# Patient Record
Sex: FEMALE | Race: WHITE | NOT HISPANIC OR LATINO | Employment: UNEMPLOYED | URBAN - METROPOLITAN AREA
[De-identification: names, ages, dates, MRNs, and addresses within clinical notes are randomized per-mention and may not be internally consistent; named-entity substitution may affect disease eponyms.]

---

## 2017-04-28 ENCOUNTER — OFFICE VISIT (OUTPATIENT)
Dept: SURGERY | Facility: CLINIC | Age: 42
End: 2017-04-28
Payer: COMMERCIAL

## 2017-04-28 VITALS
HEIGHT: 67 IN | WEIGHT: 135.56 LBS | TEMPERATURE: 98 F | SYSTOLIC BLOOD PRESSURE: 118 MMHG | DIASTOLIC BLOOD PRESSURE: 71 MMHG | BODY MASS INDEX: 21.28 KG/M2 | HEART RATE: 70 BPM

## 2017-04-28 DIAGNOSIS — K42.9 UMBILICAL HERNIA WITHOUT OBSTRUCTION AND WITHOUT GANGRENE: ICD-10-CM

## 2017-04-28 DIAGNOSIS — K40.90 NON-RECURRENT UNILATERAL INGUINAL HERNIA WITHOUT OBSTRUCTION OR GANGRENE: Primary | ICD-10-CM

## 2017-04-28 PROCEDURE — 1160F RVW MEDS BY RX/DR IN RCRD: CPT | Mod: S$GLB,,, | Performed by: SURGERY

## 2017-04-28 PROCEDURE — 99999 PR PBB SHADOW E&M-EST. PATIENT-LVL III: CPT | Mod: PBBFAC,,, | Performed by: SURGERY

## 2017-04-28 PROCEDURE — 99204 OFFICE O/P NEW MOD 45 MIN: CPT | Mod: S$GLB,,, | Performed by: SURGERY

## 2017-04-28 NOTE — PROGRESS NOTES
Subjective:       Patient ID: Elizabeth Vernon is a 41 y.o. female.    Chief Complaint: Consult (-Groin Hernia)    HPI  Pleasant 42 yo F presenting to me for evaluation of hernia.  Pt notes that she has noted an umbilical bulge for several months to years  SH enotes that it doesn't really bother her but that it does seem to have gotten slightly larger.  She also notes a slight bulge in her L inguinal area that she is concerned about as being a hernia.  The one in the L groin is slightly uncomfortable.  She denies n/v.  No fever/chills.  No changes in bowel habits  Pt is otherwise healthy.  She has had an appendectomy but no other abdominal surgery  Review of Systems   Constitutional: Negative for activity change, appetite change, fever and unexpected weight change.   Respiratory: Negative for chest tightness, shortness of breath and wheezing.    Cardiovascular: Negative for chest pain.   Gastrointestinal: Positive for abdominal pain. Negative for abdominal distention, constipation, diarrhea, nausea and vomiting.   Genitourinary: Negative for difficulty urinating, dysuria and frequency.   Skin: Negative for color change and wound.   Neurological: Negative for dizziness.   Hematological: Negative for adenopathy. Does not bruise/bleed easily.   Psychiatric/Behavioral: Negative for agitation and decreased concentration.       Objective:      Physical Exam   Constitutional: She is oriented to person, place, and time. She appears well-developed and well-nourished.   HENT:   Head: Normocephalic and atraumatic.   Eyes: Pupils are equal, round, and reactive to light.   Neck: Normal range of motion. Neck supple. No tracheal deviation present. No thyromegaly present.   Cardiovascular: Normal rate, regular rhythm and normal heart sounds.    No murmur heard.  Pulmonary/Chest: Effort normal and breath sounds normal. She exhibits no tenderness.   Abdominal: Soft. Bowel sounds are normal. She exhibits no distension, no  abdominal bruit, no pulsatile midline mass and no mass. There is no hepatosplenomegaly. There is no tenderness. There is no rigidity, no rebound, no guarding, no tenderness at McBurney's point and negative Caceres's sign. A hernia is present. Hernia confirmed negative in the ventral area.       Genitourinary: Rectum normal.   Musculoskeletal: Normal range of motion.   Neurological: She is alert and oriented to person, place, and time.   Skin: Skin is warm. No rash noted. No erythema.   Psychiatric: She has a normal mood and affect.   Vitals reviewed.      Assessment:     L inguinal hernia umbilical henria  No diagnosis found.    Plan:       D/w pt.  I have informed her that she has both an umbilical hernia and inguinal hernia.  D/w pt that in the abscence of symptoms watchful waiting would be an appropriate approach.  If hernia were to get larger or become symptomatic would then recommend surgical repair.  D/w pt that surgery would require period of limited activity for 6 weeks post op.  Pt currently breast feeding and with infant.  Prefers to defer surgery at present. Pt made aware of signs/symptoms of incarceration.  She will call to schedule when she is ready

## 2017-04-28 NOTE — MR AVS SNAPSHOT
"    [facility]  [address] [facility] [address]  [place] [address]  Phone: [phone]                  Elizabeth Vernon   2017 10:30 AM   Office Visit    Description:  Female : 1975   Provider:  Phil Leyva MD   Department:  [facility]           Reason for Visit     Consult           Diagnoses this Visit        Comments    Non-recurrent unilateral inguinal hernia without obstruction or gangrene    -  Primary     Umbilical hernia without obstruction and without gangrene                To Do List           Goals (5 Years of Data)     None      [facility]     [facility] Care Line -  Assistance  Unless otherwise directed by your provider, please contact [facility], our nurse care line that is available for  assistance.     Registered nurses in the [facility] Center provide: appointment scheduling, clinical advisement, health education, and other advisory services.  Call: [phone] (toll free)               Medications           STOP taking these medications     PNV #[id number] #6-DHA ORAL Take 1 tablet by mouth.           Verify that the below list of medications is an accurate representation of the medications you are currently taking.  If none reported, the list may be blank. If incorrect, please contact your healthcare provider. Carry this list with you in case of emergency.           Current Medications            Clinical Reference Information           Your Vitals Were     BP Pulse Temp Height Weight Last Period    118/71 70 97.6 °F (36.4 °C) (Oral) 5' 7" (1.702 m) 61.5 kg (135 lb 9.3 oz) 10/28/2015    BMI                21.24 kg/m2          Blood Pressure          Most Recent Value    BP  118/71      Allergies as of 2017     Shellfish Containing Products      Immunizations Administered on Date of Encounter - 2017     None      Language Assistance Services     ATTENTION: Language assistance services are available, " free of charge. Please call [phone].      ATENCIÓN: Si habla español, tiene a steen disposición servicios gratuitos de asistencia lingüística. Llame al [phone].     CHÚ Ý: N?u b?n nói Ti?ng Vi?t, có các d?ch v? h? tr? ngôn ng? mi?n phí dành cho b?n. G?i s? [phone].         [facility] complies with applicable Federal civil rights laws and does not discriminate on the basis of race, color, national origin, age, disability, or sex.

## 2017-11-24 ENCOUNTER — OFFICE VISIT (OUTPATIENT)
Dept: URGENT CARE | Facility: CLINIC | Age: 42
End: 2017-11-24
Payer: COMMERCIAL

## 2017-11-24 VITALS
TEMPERATURE: 98 F | OXYGEN SATURATION: 100 % | SYSTOLIC BLOOD PRESSURE: 105 MMHG | HEART RATE: 76 BPM | DIASTOLIC BLOOD PRESSURE: 70 MMHG | RESPIRATION RATE: 14 BRPM

## 2017-11-24 DIAGNOSIS — J32.9 SINUSITIS, UNSPECIFIED CHRONICITY, UNSPECIFIED LOCATION: Primary | ICD-10-CM

## 2017-11-24 PROCEDURE — 99213 OFFICE O/P EST LOW 20 MIN: CPT | Mod: 25,S$GLB,, | Performed by: EMERGENCY MEDICINE

## 2017-11-24 PROCEDURE — 96372 THER/PROPH/DIAG INJ SC/IM: CPT | Mod: S$GLB,,, | Performed by: EMERGENCY MEDICINE

## 2017-11-24 RX ORDER — BETAMETHASONE SODIUM PHOSPHATE AND BETAMETHASONE ACETATE 3; 3 MG/ML; MG/ML
12 INJECTION, SUSPENSION INTRA-ARTICULAR; INTRALESIONAL; INTRAMUSCULAR; SOFT TISSUE
Status: COMPLETED | OUTPATIENT
Start: 2017-11-24 | End: 2017-11-24

## 2017-11-24 RX ORDER — CEFPROZIL 500 MG/1
500 TABLET, FILM COATED ORAL 2 TIMES DAILY
Qty: 20 TABLET | Refills: 0 | Status: SHIPPED | OUTPATIENT
Start: 2017-11-24 | End: 2017-12-04

## 2017-11-24 RX ADMIN — BETAMETHASONE SODIUM PHOSPHATE AND BETAMETHASONE ACETATE 12 MG: 3; 3 INJECTION, SUSPENSION INTRA-ARTICULAR; INTRALESIONAL; INTRAMUSCULAR; SOFT TISSUE at 01:11

## 2017-11-24 NOTE — PROGRESS NOTES
Subjective:       Patient ID: Elizabeth Vernon is a 41 y.o. female.    Vitals:  oral temperature is 97.9 °F (36.6 °C). Her blood pressure is 105/70 and her pulse is 76. Her respiration is 14 and oxygen saturation is 100%.     Chief Complaint: Cough    PT C/O SINUS CONGESTION, 6 DAYS, POST NASAL DRIP, PRODUCTIVE COUGH, HEADACHE, LEFT EAR PRESSURE, SORE THROAT, TAKING TYLENOL AND BENADRYL WITH NO RELIEF       Cough   This is a new problem. The current episode started in the past 7 days. The problem has been unchanged. The problem occurs constantly. The cough is productive of sputum. Associated symptoms include ear congestion, nasal congestion, postnasal drip and a sore throat. Her past medical history is significant for bronchitis and pneumonia. There is no history of asthma.     Review of Systems   HENT: Positive for postnasal drip and sore throat.    Respiratory: Positive for cough.        Objective:      Physical Exam   Constitutional: She is oriented to person, place, and time. She appears well-developed and well-nourished. She is cooperative.  Non-toxic appearance. She does not appear ill. No distress.   HENT:   Head: Normocephalic and atraumatic.   Right Ear: Hearing, tympanic membrane, external ear and ear canal normal.   Left Ear: Hearing, tympanic membrane, external ear and ear canal normal.   Nose: Mucosal edema and rhinorrhea present. No nasal deformity. No epistaxis. Right sinus exhibits no maxillary sinus tenderness and no frontal sinus tenderness. Left sinus exhibits maxillary sinus tenderness. Left sinus exhibits no frontal sinus tenderness.   Mouth/Throat: Uvula is midline, oropharynx is clear and moist and mucous membranes are normal. No trismus in the jaw. Normal dentition. No uvula swelling. No posterior oropharyngeal erythema.   Eyes: Conjunctivae and lids are normal. No scleral icterus.   Sclera clear bilat   Neck: Trachea normal, full passive range of motion without pain and phonation  normal.   Cardiovascular: Normal rate, regular rhythm, normal heart sounds, intact distal pulses and normal pulses.    Pulmonary/Chest: Effort normal and breath sounds normal. No respiratory distress.   Abdominal: Normal appearance. She exhibits no distension. There is no tenderness.   Musculoskeletal: Normal range of motion. She exhibits no edema or deformity.   Neurological: She is alert and oriented to person, place, and time. She exhibits normal muscle tone. Coordination normal.   Skin: Skin is warm, dry and intact. She is not diaphoretic. No pallor.   Psychiatric: She has a normal mood and affect. Her speech is normal and behavior is normal. Judgment and thought content normal. Cognition and memory are normal.   Nursing note and vitals reviewed.      Assessment:       1. Sinusitis, unspecified chronicity, unspecified location        Plan:         Sinusitis, unspecified chronicity, unspecified location  -     betamethasone acetate-betamethasone sodium phosphate injection 12 mg; Inject 2 mLs (12 mg total) into the muscle one time.  -     cefPROZIL (CEFZIL) 500 MG tablet; Take 1 tablet (500 mg total) by mouth 2 (two) times daily.  Dispense: 20 tablet; Refill: 0

## 2017-11-27 ENCOUNTER — TELEPHONE (OUTPATIENT)
Dept: URGENT CARE | Facility: CLINIC | Age: 42
End: 2017-11-27

## 2017-12-04 ENCOUNTER — OFFICE VISIT (OUTPATIENT)
Dept: URGENT CARE | Facility: CLINIC | Age: 42
End: 2017-12-04
Payer: COMMERCIAL

## 2017-12-04 VITALS
HEART RATE: 81 BPM | DIASTOLIC BLOOD PRESSURE: 79 MMHG | BODY MASS INDEX: 21.19 KG/M2 | SYSTOLIC BLOOD PRESSURE: 120 MMHG | HEIGHT: 67 IN | WEIGHT: 135 LBS | OXYGEN SATURATION: 98 % | TEMPERATURE: 99 F

## 2017-12-04 DIAGNOSIS — R07.9 CHEST PAIN, UNSPECIFIED TYPE: ICD-10-CM

## 2017-12-04 DIAGNOSIS — B34.9 VIRAL SYNDROME: Primary | ICD-10-CM

## 2017-12-04 DIAGNOSIS — R10.11 RUQ PAIN: ICD-10-CM

## 2017-12-04 PROCEDURE — 99213 OFFICE O/P EST LOW 20 MIN: CPT | Mod: S$GLB,,, | Performed by: PHYSICIAN ASSISTANT

## 2017-12-04 NOTE — PROGRESS NOTES
"Subjective:       Patient ID: Elizabeth Vernon is a 42 y.o. female.    Vitals:  height is 5' 7" (1.702 m) and weight is 61.2 kg (135 lb). Her temperature is 98.7 °F (37.1 °C). Her blood pressure is 120/79 and her pulse is 81. Her oxygen saturation is 98%.     Chief Complaint: Sinus Problem    Patient was seen on 11/24/2017 and just finished her antibiotics this morning. She states her cough is worse and is having pain on the right side.       Sinus Problem   This is a new problem. The current episode started 1 to 4 weeks ago. The problem has been gradually worsening since onset. Associated symptoms include chills (resolved since last visit), congestion (resolved since last visit), coughing, ear pain (resolved since last visit) and headaches. Pertinent negatives include no hoarse voice, neck pain, shortness of breath, sinus pressure or sore throat. Past treatments include nothing. The treatment provided no relief.     Review of Systems   Constitution: Positive for chills (resolved since last visit), decreased appetite and malaise/fatigue. Negative for fever and night sweats.   HENT: Positive for congestion (resolved since last visit) and ear pain (resolved since last visit). Negative for hoarse voice, sinus pressure and sore throat.    Eyes: Negative for blurred vision, discharge, pain, redness and visual disturbance.   Cardiovascular: Positive for chest pain (with coughing and deep breathing on lower right chest/right upper abdomen). Negative for dyspnea on exertion, leg swelling, near-syncope and syncope.   Respiratory: Positive for cough and sputum production (yellow/green). Negative for shortness of breath and wheezing.    Hematologic/Lymphatic: Negative for adenopathy.   Skin: Negative for itching and rash.   Musculoskeletal: Negative for back pain, myalgias, neck pain and stiffness.   Gastrointestinal: Positive for diarrhea. Negative for abdominal pain, nausea and vomiting.   Neurological: Positive for " headaches. Negative for dizziness, light-headedness and numbness.   Psychiatric/Behavioral: Negative for altered mental status.   Allergic/Immunologic: Negative for hives.   All other systems reviewed and are negative.      Objective:      Physical Exam   Constitutional: She is oriented to person, place, and time. She appears well-developed and well-nourished.  Non-toxic appearance. She does not have a sickly appearance. She does not appear ill. No distress.   HENT:   Head: Normocephalic and atraumatic.   Right Ear: Hearing, tympanic membrane, external ear and ear canal normal.   Left Ear: Hearing, tympanic membrane, external ear and ear canal normal.   Nose: No mucosal edema. No epistaxis. Right sinus exhibits no maxillary sinus tenderness and no frontal sinus tenderness. Left sinus exhibits no maxillary sinus tenderness and no frontal sinus tenderness.   Mouth/Throat: Uvula is midline and mucous membranes are normal. No uvula swelling. Posterior oropharyngeal erythema present. No oropharyngeal exudate.   Bilateral nasal congestion and erythema    Eyes: Pupils are equal, round, and reactive to light.   Neck: Normal range of motion and full passive range of motion without pain. Neck supple. No neck rigidity.   Cardiovascular: Normal rate, regular rhythm and normal heart sounds.  Exam reveals no gallop and no friction rub.    No murmur heard.  Pulmonary/Chest: Effort normal. No accessory muscle usage. No tachypnea and no bradypnea. No respiratory distress. She has no decreased breath sounds. She has no wheezes. She has no rhonchi. She has no rales. She exhibits tenderness and bony tenderness (some costochondral junction pain on right side). She exhibits no mass, no laceration, no crepitus, no edema, no deformity, no swelling and no retraction.       Abdominal: Soft. Normal appearance and bowel sounds are normal. She exhibits no distension. There is no hepatosplenomegaly. There is tenderness in the right upper  quadrant. There is no rigidity, no rebound, no guarding, no CVA tenderness, no tenderness at McBurney's point and negative Caceres's sign. No hernia.       Musculoskeletal: Normal range of motion.   Lymphadenopathy:        Head (right side): No submental, no submandibular, no preauricular, no posterior auricular and no occipital adenopathy present.        Head (left side): No submental, no submandibular, no preauricular, no posterior auricular and no occipital adenopathy present.     She has no cervical adenopathy.        Right cervical: No posterior cervical adenopathy present.       Left cervical: No posterior cervical adenopathy present.        Right: No supraclavicular adenopathy present.        Left: No supraclavicular adenopathy present.   Neurological: She is alert and oriented to person, place, and time. She is not disoriented. Coordination and gait normal.   Skin: No abrasion, no ecchymosis, no laceration and no rash noted. No erythema.   Psychiatric: She has a normal mood and affect. Her behavior is normal. Judgment normal. Cognition and memory are normal.   Nursing note and vitals reviewed.      PA and Lateral, 2 view chest is obtained.The mediastinal and cardiac size and contours are normal. The diaphragms and pleura are clear. There are no intra-pulmonary masses or infiltrates. There is no pneumothorax or pleural effusion.    Impression-Negative chest.      Electronically signed by: Danny Coronado MD  Date: 12/04/17  Time: 12:52   ------------------------------------------------------------------------------------------------------------  Single view abdomen was obtained The fecal and gas pattern is within normal limits. No free air is seen. The psoas shadows are sharp. The osseous structures are unremarkable. Several phleboliths are identified in the left pelvis. No masses or abnormal calcifications are seen.   Impression   Negative abdomen x-rays.      Electronically signed by: Danny Coronado  MD  Date: 12/04/17  Time: 12:53      Assessment:       1. Viral syndrome    2. Chest pain, unspecified type    3. RUQ pain        Normal Chest and Abdominal radiology studies. Pain most likely secondary to excessive coughing.   Persistent symptoms most likely due to viral etiology    Plan:         Viral syndrome    Chest pain, unspecified type  -     X-Ray Chest PA And Lateral; Future; Expected date: 12/04/2017    RUQ pain  -     X-Ray Abdomen AP 1 View; Future; Expected date: 12/04/2017    - Please return here or go to the Emergency Department for any concerns or worsening of condition.   - Use over-the-counter (OTC) Tylenol (acetaminophen) every 4-6 hours and/or Motrin/Advil (ibuprofen) every 6-8 hours as needed for pain or fever unless you have known allergies or been warned to avoid the medications due to other medical conditions. You may use the medications at the same time as they do not negatively interact with each other.    - Please follow up with your primary care provider (PCP) or discussed specialist(s) as needed.

## 2017-12-08 ENCOUNTER — TELEPHONE (OUTPATIENT)
Dept: URGENT CARE | Facility: CLINIC | Age: 42
End: 2017-12-08